# Patient Record
Sex: MALE | Race: WHITE | NOT HISPANIC OR LATINO | Employment: FULL TIME | URBAN - METROPOLITAN AREA
[De-identification: names, ages, dates, MRNs, and addresses within clinical notes are randomized per-mention and may not be internally consistent; named-entity substitution may affect disease eponyms.]

---

## 2017-01-19 ENCOUNTER — OFFICE VISIT - HEALTHEAST (OUTPATIENT)
Dept: FAMILY MEDICINE | Facility: CLINIC | Age: 32
End: 2017-01-19

## 2017-01-19 ENCOUNTER — COMMUNICATION - HEALTHEAST (OUTPATIENT)
Dept: TELEHEALTH | Facility: CLINIC | Age: 32
End: 2017-01-19

## 2017-01-19 DIAGNOSIS — R19.7 DIARRHEA OF PRESUMED INFECTIOUS ORIGIN: ICD-10-CM

## 2017-01-19 DIAGNOSIS — Z00.00 HEALTH CARE MAINTENANCE: ICD-10-CM

## 2017-01-19 LAB
CHOLEST SERPL-MCNC: 217 MG/DL
FASTING STATUS PATIENT QL REPORTED: YES
HDLC SERPL-MCNC: 66 MG/DL
LDLC SERPL CALC-MCNC: 135 MG/DL
TRIGL SERPL-MCNC: 82 MG/DL

## 2017-01-19 ASSESSMENT — MIFFLIN-ST. JEOR: SCORE: 1818.04

## 2017-01-20 ENCOUNTER — COMMUNICATION - HEALTHEAST (OUTPATIENT)
Dept: FAMILY MEDICINE | Facility: CLINIC | Age: 32
End: 2017-01-20

## 2018-12-09 ENCOUNTER — OFFICE VISIT - HEALTHEAST (OUTPATIENT)
Dept: FAMILY MEDICINE | Facility: CLINIC | Age: 33
End: 2018-12-09

## 2018-12-09 DIAGNOSIS — R07.0 THROAT PAIN: ICD-10-CM

## 2018-12-09 DIAGNOSIS — J11.1 INFLUENZA-LIKE ILLNESS: ICD-10-CM

## 2018-12-09 DIAGNOSIS — R50.9 FEVER AND CHILLS: ICD-10-CM

## 2018-12-09 LAB
DEPRECATED S PYO AG THROAT QL EIA: NORMAL
FLUAV AG SPEC QL IA: NORMAL
FLUBV AG SPEC QL IA: NORMAL

## 2018-12-10 LAB — GROUP A STREP BY PCR: NORMAL

## 2019-08-01 ENCOUNTER — OFFICE VISIT - HEALTHEAST (OUTPATIENT)
Dept: FAMILY MEDICINE | Facility: CLINIC | Age: 34
End: 2019-08-01

## 2019-08-01 DIAGNOSIS — R51.9 HEADACHE DUE TO VIRAL INFECTION: ICD-10-CM

## 2019-08-01 DIAGNOSIS — B34.9 HEADACHE DUE TO VIRAL INFECTION: ICD-10-CM

## 2020-01-27 ENCOUNTER — OFFICE VISIT - HEALTHEAST (OUTPATIENT)
Dept: FAMILY MEDICINE | Facility: CLINIC | Age: 35
End: 2020-01-27

## 2020-01-27 DIAGNOSIS — Z12.83 SKIN EXAM, SCREENING FOR CANCER: ICD-10-CM

## 2020-01-27 DIAGNOSIS — Z00.00 ROUTINE GENERAL MEDICAL EXAMINATION AT A HEALTH CARE FACILITY: ICD-10-CM

## 2020-01-27 DIAGNOSIS — R00.2 PALPITATIONS: ICD-10-CM

## 2020-01-27 DIAGNOSIS — Z71.84 COUNSELING ABOUT TRAVEL: ICD-10-CM

## 2020-01-27 DIAGNOSIS — R51.9 ACUTE NONINTRACTABLE HEADACHE, UNSPECIFIED HEADACHE TYPE: ICD-10-CM

## 2020-01-27 LAB
ATRIAL RATE - MUSE: 65 BPM
DIASTOLIC BLOOD PRESSURE - MUSE: NORMAL
INTERPRETATION ECG - MUSE: NORMAL
P AXIS - MUSE: 62 DEGREES
PR INTERVAL - MUSE: 154 MS
QRS DURATION - MUSE: 92 MS
QT - MUSE: 374 MS
QTC - MUSE: 388 MS
R AXIS - MUSE: 54 DEGREES
SYSTOLIC BLOOD PRESSURE - MUSE: NORMAL
T AXIS - MUSE: 55 DEGREES
VENTRICULAR RATE- MUSE: 65 BPM

## 2020-01-27 RX ORDER — ATOVAQUONE AND PROGUANIL HYDROCHLORIDE 250; 100 MG/1; MG/1
1 TABLET, FILM COATED ORAL
Qty: 28 TABLET | Refills: 0 | Status: SHIPPED | OUTPATIENT
Start: 2020-01-27 | End: 2023-06-05

## 2020-01-27 ASSESSMENT — MIFFLIN-ST. JEOR: SCORE: 1877.46

## 2021-01-27 ENCOUNTER — AMBULATORY - HEALTHEAST (OUTPATIENT)
Dept: FAMILY MEDICINE | Facility: CLINIC | Age: 36
End: 2021-01-27

## 2021-01-27 DIAGNOSIS — Z20.822 EXPOSURE TO COVID-19 VIRUS: ICD-10-CM

## 2021-05-30 VITALS — WEIGHT: 192 LBS | BODY MASS INDEX: 26.88 KG/M2 | HEIGHT: 71 IN

## 2021-06-02 VITALS — BODY MASS INDEX: 28.59 KG/M2 | WEIGHT: 205 LBS

## 2021-06-03 VITALS — WEIGHT: 198 LBS | BODY MASS INDEX: 27.62 KG/M2

## 2021-06-04 VITALS
WEIGHT: 205.1 LBS | DIASTOLIC BLOOD PRESSURE: 56 MMHG | HEART RATE: 77 BPM | OXYGEN SATURATION: 96 % | BODY MASS INDEX: 28.71 KG/M2 | SYSTOLIC BLOOD PRESSURE: 100 MMHG | HEIGHT: 71 IN

## 2021-06-05 NOTE — PROGRESS NOTES
Assessment and Plan:     1. Routine general medical examination at a health care facility  Discussed consuming a healthy diet and exercising.  Unfortunately, we do not have his vaccine record available.  He believes he is up-to-date on hepatitis A, hepatitis B, tetanus and MMR vaccines.    2. Skin exam, screening for cancer  Patient has multiple flesh-colored nevi present.  He would like a referral to dermatology as he would like to have any of these excised.  - Ambulatory referral to Dermatology    3. Palpitations  EKG shows normal sinus rhythm with no ischemic changes.  Will rule out anemia, electrolyte imbalance, thyroid disease.  If symptoms persist, may consider referral to cardiology or will obtain Holter monitor.  Anxiety may have been contributing.  He denies any recent travel.  Discussed avoidance of caffeine.  - Electrocardiogram Perform - Clinic  - HM2(CBC w/o Differential)  - Basic Metabolic Panel  - Thyroid Cascade    4. Acute nonintractable headache, unspecified headache type  Differentials include migraine headache, tension headache, cluster headache, allergies.  His headache resolved after a day.  This was not the worst headache he ever had.  Discussed taking over-the-counter ibuprofen at the onset of the next headache.  I suspect he may be experiencing migraines.  If headaches return, may consider referral to neurology or MRI for further evaluation.  I encouraged him to see an ophthalmologist.  Discussed the importance of good hydration.    5. Counseling about travel  Patient believes he is up-to-date on hepatitis A, hepatitis B, MMR, tetanus vaccines.  Provided typhoid bed seen and polio vaccine today.  He is not interested in a rabies vaccine.  Provided prescription for atovaquone-proguanil for malaria prophylaxis.  Educated on indications and side effects.  Provided prescription for Cipro to use as needed for traveler's diarrhea.  Discussed mosquito avoidance.  He is content with the plan.  -  atovaquone-proguaniL (MALARONE) 250-100 mg Tab; Take 1 tablet by mouth daily with breakfast. Start 1-2 days before exposure and continue for 7 days after exposure  Dispense: 28 tablet; Refill: 0  - ciprofloxacin HCl (CIPRO) 500 MG tablet; Take 1 tablet (500 mg total) by mouth 2 (two) times a day for 5 days. As needed for traveler's diarrhea  Dispense: 10 tablet; Refill: 0  - Typhoid, Inactive, Inj  - Poliovirus vaccine IPV subq/IM    Subjective:     Yury is a 34 y.o. male presenting to the clinic for a male physical and other concerns.  Patient has been  for 4 years.  He is a 2-year-old son.  He has no concerns for STDs.  He is eating healthy and exercises 5 days/week through cardio and strength training.  Patient states on Wednesday, he felt intermittent dizziness.  He had a slight headache.  Patient states when he moved his right eye, he felt his heartbeat in his eye.  He had moments where he felt as though his heart was racing.  He was not experiencing any cold symptoms or fever.  He did not have any ear pain or pressure.  He denies nausea, vomiting, photophobia.  He has not had any shortness of breath, chest tightness, wheezing.  No fever is present.  Patient states he woke up the next day and his symptoms had resolved.  He has been asymptomatic since. He has not seen an ophthalmologist since childhood.  He does consume pre-workout drink with caffeine regularly.  Patient has some moles present on his back.  They have been present since childhood.  His mother had skin cancer.  He wears sunscreen now, but has a history of frequent sunburns and tanning bed use in the past.  Lastly, patient is traveling on 1/29/2020 to [place] [facility].  He is traveling with his wife and son.  They will also be visiting the [facility].  They are returning home on 2/17/2020.    Review of Systems: A complete 14 point review of systems was obtained and is negative or as stated in the history of present illness.    Social History  "    Socioeconomic History     Marital status:      Spouse name: Not on file     Number of children: Not on file     Years of education: Not on file     Highest education level: Not on file   Occupational History     Not on file   Social Needs     Financial resource strain: Not on file     Food insecurity:     Worry: Not on file     Inability: Not on file     Transportation needs:     Medical: Not on file     Non-medical: Not on file   Tobacco Use     Smoking status: Never Smoker     Smokeless tobacco: Current User     Types: Chew   Substance and Sexual Activity     Alcohol use: No     Drug use: No     Sexual activity: Not on file   Lifestyle     Physical activity:     Days per week: Not on file     Minutes per session: Not on file     Stress: Not on file   Relationships     Social connections:     Talks on phone: Not on file     Gets together: Not on file     Attends Rastafari service: Not on file     Active member of club or organization: Not on file     Attends meetings of clubs or organizations: Not on file     Relationship status: Not on file     Intimate partner violence:     Fear of current or ex partner: Not on file     Emotionally abused: Not on file     Physically abused: Not on file     Forced sexual activity: Not on file   Other Topics Concern     Not on file   Social History Narrative     Not on file       Active Ambulatory Problems     Diagnosis Date Noted     No Active Ambulatory Problems     Resolved Ambulatory Problems     Diagnosis Date Noted     No Resolved Ambulatory Problems     No Additional Past Medical History       Family History   Problem Relation Age of Onset     Skin cancer Mother      Cancer Neg Hx      Colon cancer Neg Hx      Heart disease Neg Hx        Objective:     /56 (Patient Site: Left Arm, Cuff Size: Adult Large)   Pulse 77   Ht 5' 11\" (1.803 m)   Wt 205 lb 1.6 oz (93 kg)   SpO2 96%   BMI 28.61 kg/m      General Appearance:    Alert, cooperative, no distress, " appears stated age   Head:    Normocephalic, without obvious abnormality, atraumatic   Eyes:    PERRL, conjunctiva/corneas clear, EOM's intact, fundi     benign, both eyes        Ears:    Normal TM's and external ear canals, both ears   Nose:   Nares normal, septum midline, mucosa normal, no drainage    or sinus tenderness   Throat:   Lips, mucosa, and tongue normal; teeth and gums normal   Neck:   Supple, symmetrical, trachea midline, no adenopathy;        thyroid:  No enlargement/tenderness/nodules; no carotid    bruit or JVD   Back:     Symmetric, no curvature, ROM normal, no CVA tenderness   Lungs:     Clear to auscultation bilaterally, respirations unlabored   Chest wall:    No tenderness or deformity   Heart:    Regular rate and rhythm, S1 and S2 normal, no murmur, rub    or gallop   Abdomen:     Soft, non-tender, bowel sounds active all four quadrants,     no masses, no organomegaly   Genitalia:    Deferred per patient        Extremities:   Extremities normal, atraumatic, no cyanosis or edema   Pulses:   2+ and symmetric all extremities   Skin:   Skin color, texture, turgor normal, no rashes or lesions   Lymph nodes:   Cervical, supraclavicular, and axillary nodes normal   Neurologic:   CNII-XII intact. Normal strength, sensation and reflexes       throughout       LABORATORY: I ordered and personally reviewed an EKG showing normal sinus rhythm.

## 2021-06-08 NOTE — PROGRESS NOTES
Assessment/Plan:     1. Health care maintenance  Age appropriate health care screening and guidance discussed. Labs as below  - Lipid Cascade FASTING  - Glucose; Future  - Hemoglobin  - Glucose    2. Diarrhea of presumed infectious origin  Possible travler's related. Symptoms have improved. Denies the need for further workup. Call or return to care if symptoms continue          Subjective:      Yury Humphries is a 31 y.o. male comes in today to establish care. Reviewed past history. Has been fairly healthy. Requests labs and physical. States no recent physical. works out, eats healthy.  Fasting for labs  Her primarily for follow up on diarrhea. Was in [place] for wedding started with diarrhea and was given amoxicillin there by his wifes father. Has some blood on the toilet tissue after wiping.  No pain. Mild abd cramping. No fever. Symptoms resolved. No further concerns. Never had trouble before. No colon cancer in family, feels well now     No current outpatient prescriptions on file.     No current facility-administered medications for this visit.        Past Medical History, Family History, and Social History reviewed.  History reviewed. No pertinent past medical history.  History reviewed. No pertinent surgical history.  Review of patient's allergies indicates no known allergies.  Family History   Problem Relation Age of Onset     Skin cancer Mother      Cancer Neg Hx      Colon cancer Neg Hx      Heart disease Neg Hx      Social History     Social History     Marital status:      Spouse name: N/A     Number of children: N/A     Years of education: N/A     Occupational History     Not on file.     Social History Main Topics     Smoking status: Never Smoker     Smokeless tobacco: Current User     Types: Chew     Alcohol use No     Drug use: No     Sexual activity: Not on file     Other Topics Concern     Not on file     Social History Narrative         Review of systems is as stated in HPI, and the  "remainder of the 10 system review is otherwise negative.    Objective:     Vitals:    01/19/17 0943   BP: 112/70   Patient Site: Left Arm   Patient Position: Sitting   Cuff Size: Adult Regular   Pulse: 69   SpO2: 94%   Weight: 192 lb (87.1 kg)   Height: 5' 11\" (1.803 m)    Body mass index is 26.78 kg/(m^2).    General Appearance:    Alert, cooperative, no distress, appears stated age   Head:    Normocephalic, without obvious abnormality, atraumatic   Eyes:    PERRL, EOM's intact   Ears:    Normal TM's and external ear canals   Nose:   Mucosa normal, no drainage     or sinus tenderness   Throat:   Oropharynx is clear   Neck:   Supple, symmetrical, no adenopathy, no thyromegally       Lungs:     Clear to auscultation bilaterally, respirations unlabored   Chest Wall:    No tenderness or deformity    Heart:    Regular rate and rhythm, S1 and S2 normal, no murmur, rub    or gallop       Abdomen:     Soft, non-tender, bowel sounds active all four quadrants,     no masses, no organomegaly           Extremities:   Extremities normal, atraumatic, no cyanosis or edema   Pulses:   2+ and symmetric all extremities   Skin:   No rashes or lesions         This note has been dictated using voice recognition software. Any grammatical or context distortions are unintentional and inherent to the the software.   "

## 2021-06-17 NOTE — PATIENT INSTRUCTIONS - HE
"Patient Instructions by Palak Ramirez MD at 8/1/2019  6:30 PM     Author: Palak Ramirez MD Service: -- Author Type: Physician    Filed: 8/1/2019  8:02 PM Encounter Date: 8/1/2019 Status: Addendum    : Palak Ramirez MD (Physician)    Related Notes: Original Note by Palak Ramirez MD (Physician) filed at 8/1/2019  8:00 PM       1. Avoid re-injury or any activity which aggravates the pain  2. May alternate ibuprofen 3 tabs every 6 hours with tylenol 1000 mg every 6 hours as needed for pain  3. Try pseudoephedrine 120 mg - 1 tab every 12 hours as needed for head congestion for max of 3 days  4. If your headache is not responding to either ibuprofen or tylenol, either call the clinic triage number or be seen at [facility] emergency department  5. Call clinic number with any questions - answered 24/7    Patient Education     Viral Syndrome (Adult)  A viral illness may cause a number of symptoms. The symptoms depend on the part of the body that the virus affects. If it settles in your nose, throat, and lungs, it may cause cough, sore throat, congestion, and sometimes headache. If it settles in your stomach and intestinal tract, it may cause vomiting and diarrhea. Sometimes it causes vague symptoms like \"aching all over,\" feeling tired, loss of appetite, or fever.  A viral illness usually lasts 1 to 2 weeks, but sometimes it lasts longer. In some cases, a more serious infection can look like a viral syndrome in the first few days of the illness. You may need another exam and additional tests to know the difference. Watch for the warning signs listed below.  Home care  Follow these guidelines for taking care of yourself at home:    If symptoms are severe, rest at home for the first 2 to 3 days.    Stay away from cigarette smoke - both your smoke and the smoke from others.    You may use over-the-counter acetaminophen or ibuprofen for fever, muscle aching, and headache, unless another medicine was " prescribed for this. If you have chronic liver or kidney disease or ever had a stomach ulcer or GI bleeding, talk with your doctor before using these medicines. No one who is younger than 18 and ill with a fever should take aspirin. It may cause severe disease or death.    Your appetite may be poor, so a light diet is fine. Avoid dehydration by drinking 8 to 12 8-ounce glasses of fluids each day. This may include water; orange juice; lemonade; apple, grape, and cranberry juice; clear fruit drinks; electrolyte replacement and sports drinks; and decaffeinated teas and coffee. If you have been diagnosed with a kidney disease, ask your doctor how much and what types of fluids you should drink to prevent dehydration. If you have kidney disease, drinking too much fluid can cause it build up in the your body and be dangerous to your health.    Over-the-counter remedies won't shorten the length of the illness but may be helpful for cough, sore throat; and nasal and sinus congestion. Don't use decongestants if you have high blood pressure.  Follow-up care  Follow up with your healthcare provider if you do not improve over the next week.  Call 911  Call 911 if any of the following occur:    Convulsion    Feeling weak, dizzy, or like you are going to faint    Chest pain, shortness of breath, wheezing, or difficulty breathing  When to seek medical advice  Call your healthcare provider right away if any of these occur:    Cough with lots of colored sputum (mucus) or blood in your sputum    Chest pain, shortness of breath, wheezing, or difficulty breathing    Severe headache; face, neck, or ear pain    Severe, constant pain in the lower right side of your belly (abdominal)    Continued vomiting (cant keep liquids down)    Frequent diarrhea (more than 5 times a day); blood (red or black color) or mucus in diarrhea    Feeling weak, dizzy, or like you are going to faint    Extreme thirst    Fever of 100.4 F (38 C) or higher, or as  "directed by your healthcare provider  Date Last Reviewed: 9/25/2015 2000-2017 [employer]. [address] [place] [address], [place] [address]. All rights reserved. This information is not intended as a substitute for professional medical care. Always follow your healthcare professional's instructions.         Patient Education     Headache, Unspecified    A number of things can cause headaches. The cause of your headache isnt clear. But it doesnt seem to be a sign of any serious illness.  You could have a tension headache or a migraine headache.  Stress can cause a tension headache. This can happen if you tense the muscles of your shoulders, neck, and scalp without knowing it. If this stress lasts long enough, you may develop a tension headache.  It is not clear why migraines occur, but certain things called\" triggers\" can raise the risk of having a migraine attack. Migraine triggers may include emotional stress or depression, or by hormone changes during the menstrual cycle. Other triggers include birth control pills and other medicines, alcohol or caffeine, foods with tyramine (such as aged cheese, wine), eyestrain, weather changes, missed meals, and lack of sleep or oversleeping.  Other causes of headache include:    Viral illness with high fever    Head injury with concussion    Sinus, ear, or throat infection    Dental pain and jaw joint (TMJ) pain  More serious but less common causes of headache include stroke, brain hemorrhage, brain tumor, meningitis, and encephalitis.  Home care  Follow these tips when taking care of yourself at home:    Dont drive yourself home if you were given pain medicine for your headache. Instead, have someone else drive you home. Try to sleep when you get home. You should feel much better when you wake up.    Apply heat to the back of your neck to ease a neck muscle spasm. Take care of a migraine headache by putting an ice pack on your forehead or at the base of your " skull.    If you have nausea or vomiting, eat a light diet until your headache eases.    If you have a migraine headache, use sunglasses when in the daylight or around bright indoor lighting until your symptoms get better. Bright glaring light can make this type of headache worse.  Follow-up care  Follow up with your healthcare provider, or as advised. Talk with your provider if you have frequent headaches. He or she can help figure out a treatment plan. By knowing the earliest signs of headache, and starting treatment right away, you may be able to stop the pain yourself.  When to seek medical advice  Call your healthcare provider right away if any of these occur:    Your head pain suddenly gets worse after sexual intercourse or strenuous activity    Your head pain doesnt get better within 24 hours    You arent able to keep liquids down (repeated vomiting)    Fever of 100.4 F (38 C) or higher, or as directed by your healthcare provider    Stiff neck    Extreme drowsiness, confusion, or fainting    Dizziness or dizziness with spinning sensation (vertigo)    Weakness in an arm or leg or one side of your face    You have trouble talking or seeing  Date Last Reviewed: 8/1/2016 2000-2017 [employer]. [address] [place], [place] [id number]. All rights reserved. This information is not intended as a substitute for professional medical care. Always follow your healthcare professional's instructions.       Patient Education     Self-Care for Headaches  Most headaches aren't serious and can be relieved with self-care. But some headaches may be a sign of another health problem like eye trouble or high blood pressure. To find the best treatment, learn what kind of headaches you get. For tension headaches, self-care will usually help. To treat migraines, ask your healthcare provider for advice. It is also possible to get both tension and migraine headaches. Self-care involves relieving the pain and avoiding  "headache triggers if you can.    Ways to reduce pain and tension  Try these steps:    Apply a cold compress or ice pack to the pain site.    Drink fluids. If nausea makes it hard to drink, try sucking on ice.    Rest. Protect yourself from bright light and loud noises.    Calm your emotions by imagining a peaceful scene.    Massage tight neck, shoulder, and head muscles.    To relax muscles, soak in a hot bath or use a hot shower.  Use medicines  Aspirin or aspirin substitutes, such as ibuprofen and acetaminophen, can relieve headache. Remember: Never give aspirin to anyone 18 years old or younger because of the risk of developing Reye syndrome. Use pain medicines only when necessary.  Track your headaches  Keeping a headache diary can help you and your healthcare provider identify what's causing your headaches:    Note when each headache happens.    Identify your activities and the foods you've eaten 6 to 8 hours before the headache began.    Look for any trends or \"triggers.\"  Signs of tension headache  Any of the following can be signs:    Dull pain or feeling of pressure in a tight band around your head    Pain in your neck or shoulders    Headache without a definite beginning or end    Headache after an activity such as driving or working on a computer  Signs of migraine  Any of the following can be signs:    Throbbing pain on one or both sides of your head    Nausea or vomiting    Extreme sensitivity to light, sound, and smells    Bright spots, flashes, or other visual changes    Pain or nausea so severe that you can't continue your daily activities  Call your healthcare provider   If you have any of the following symptoms, contact your healthcare provider:    A headache that lingers after a recent injury or bump to the head.    A fever with a stiff neck or pain when you bend your head toward your chest.    A headache along with slurred speech, changes in your vision, or numbness or weakness in your arms or " "legs.    A headache for longer than 3 days.    Frequent headaches, especially in the morning.    Headaches with seizures     Seek immediate medical attention if you have a headache that you would call \"the worst headache you have ever had.\"   Date Last Reviewed: 10/4/2015    2980-9412 [employer]. [address] [place] [address]. All rights reserved. This information is not intended as a substitute for professional medical care. Always follow your healthcare professional's instructions.                "

## 2021-06-22 NOTE — PROGRESS NOTES
Assessment/Plan:   Throat pain  Fever and chills  Influenza Like Illness  Fever and ST with malaise, myalgia and fatigue. RST and flu test negative. Had taken 3 doses of  amoxicillin 500mg since yesterday without much improvement. This may be not strep or it may be ineffective treatment. Also consider a flu like illness. Will complete a treatment with amoxicllin and follow up as needed.   - Influenza A/B Rapid Test  - Rapid Strep A Screen-Throat  - Group A Strep, RNA Direct Detection, Throat    Fluids, rest, steam, nasal saline if congested  Strep confirmation test is pending  Amoxicillin 875mg twice a day for 10 days  Fluids, rest, diet as tolerated  Tylenol 500-1000mg every 6 hours as needed for fever, may alternate and overlap with ibuprofen 400mg every 4-6 hours  Follow up if worse or no better after a couple days.     Subjective:      Yury Humphries is a 33 y.o. male who presents with fever and ST. This started 2 days ago with a ST initially and then last night he developed fever and has had fever and chills since then. No runny nose, some cough. Body aches and fatigue. No N/V/D. No rash. No headache or vertigo. No sinus or ear pain. His 11 month old son has had fever and URI for over a week and is being treated for an ear infection and possibly a new viral infection. He took some amoxicillin he found at home from a prior tooth infection 3-4 years ago (500mg) - twice yesterday and once this morning. No dramatic difference they could have lost their potency. No wheeze or shortness of breath. Appetite and energy are poor. Wife had similar sxs first after their son and she was diagnosed with week with an ear infection and is improving on antibiotics. No other known ill contacts. Generally healthy. NKDA    No current outpatient medications on file prior to visit.     No current facility-administered medications on file prior to visit.      There is no problem list on file for this patient.      Objective:      Pulse 96   Temp 99.7  F (37.6  C) (Oral)   Resp 16   Wt 205 lb (93 kg)   SpO2 98%   BMI 28.59 kg/m      Physical  General Appearance: Alert, cooperative, no distress, ill appearing and fatigued.   Head: Normocephalic, without obvious abnormality, atraumatic  Eyes: Conjunctivae are normal.  Ears: Normal TMs and external ear canals, both ears  Nose: mild congestion.  Throat: Throat is red, no exudate.  No significant lesions, uvula midline, no palatal petechiae  Neck: shotty tender adenopathy  Lungs: Clear to auscultation bilaterally, respirations unlabored  Heart: Regular rate and rhythm  Skin: Skin color, texture, turgor normal, no rashes or lesions  Psychiatric: Patient has a normal mood and affect.        Recent Results (from the past 24 hour(s))   Influenza A/B Rapid Test   Result Value Ref Range    Influenza  A, Rapid Antigen No Influenza A antigen detected No Influenza A antigen detected    Influenza B, Rapid Antigen No Influenza B antigen detected No Influenza B antigen detected   Rapid Strep A Screen-Throat   Result Value Ref Range    Rapid Strep A Antigen No Group A Strep detected, presumptive negative No Group A Strep detected, presumptive negative

## 2021-06-27 NOTE — PROGRESS NOTES
Progress Notes by Palak Ramirez MD at 8/1/2019  6:30 PM     Author: Palak Ramirez MD Service: -- Author Type: Physician    Filed: 8/3/2019  9:08 AM Encounter Date: 8/1/2019 Status: Signed    : Palak Ramirez MD (Physician)         Subjective:   Yury Humphries is a 33 y.o. male  Roomed by: Marleni S    Accompanied by Spouse    Refills needed? No    Do you have any forms that need to be filled out? No      Chief Complaint   Patient presents with   ? Headache     Headache x 24 hours    Says on 7/27-28 he was very fatigued and had sweats and chills. Says he felt fine on 7/29 and 7/30. Then yesterday at 4 pm he started having a slight headache. But by 7 pm he had to pull his car over to think. Took some acetaminophen 1000 mg last night and it took about 1.5 hours to help. Says that the headache.  Temp has been at 99.4. Has been laying in bed all day. Denies any sweats or chills. Admits feeling nauseated with the headache, but did not vomit. Denies any history of history of migraine headaches. Denies fatigue since 7/28. Appetite has been down. Has been drinking less, but urinating the same. Missed work today and he is the owner of a construction business. Denies CP, shortness of breath or coughing. Denies any dizziness. Denies any decreased sensation or strengths in arms or legs. Says his feet felt cold today. Denies any ill contacts. Says headache is less with tylenol and last took tylenol at 2:30 pm. Rates headache now at 1/10, but at it's worst rates it at 8-10/10. Says head is no longer pounding like when he laid down. Denies any blurred or decreased vision. Says his eyes hurt a little, and that he is normally very sensitive to light.  Review of Systems  See HPI for ROS, otherwise balance of other systems negative    No Known Allergies    Current Outpatient Medications:   ?  acetaminophen (TYLENOL) 325 MG tablet, Take 650 mg by mouth every 6 (six) hours as needed for pain., Disp: , Rfl:     No past  medical history on file. - if none on file, see Problem List    Objective:     Vitals:    08/01/19 1857   BP: 98/62   Patient Site: Right Arm   Patient Position: Sitting   Cuff Size: Adult Large   Pulse: 78   Resp: 18   Temp: 98.7  F (37.1  C)   TempSrc: Oral   SpO2: 97%   Weight: 198 lb (89.8 kg)     Gen - Pt in NAD  Eyes - PERRL, EOMI  Right conjunctiva - bulba - non injected, tarsal - non injected - no drainage  Left conjunctiva - bulbar - non injected, tarsal  - non injected - no drainage  + photophobia  Face - non TTP over frontal sinus areas; non TTP over maxillary areas  Ears - external canals - no induration, Right TM - not injected, Left TM - not injected   Nose - not congested, no nasal drainage  Pharynx - not injected, tonsils 1+ size  Neck - supple, no cervical adenopathy, no masses  Cor - RRR w/o murmur  Lungs - Good air entry; no wheezes or crackles noted on auscultation - no coughing noted  Skin - no lesions, no rashes noted  Eyes - PERRL, EOMI, Conjunctiva clear  Neuro: Oriented x 3, CN - 2-12 intact, Sensory - neg drift, Strengths - 5/5 UE/LE = , DTRs =, Coord - intact FNF, Intact tandem gait, negative Rhomberg    Assessment - Plan   Medical Decision Making -33-year-old man presents with having had fatigue starting 6 days ago for a couple of days then he felt better and then started having a headache the day before he came to walk-in.  States that the headache can be as bad as 10 out of 10 but currently it is 1 out of 10 and that Tylenol does help with the pain.  He denies any vision changes or any changes in strength of his or sensation.  He denies a history of headaches or migraines.  On exam his only positive finding was some photophobia which is not new for him.   Discussed with him that his presentation and exam did not warrant immediate imaging of his head.  Discussed that what was reassuring is that Tylenol helped with his pain and his neurologic exam was entirely within normal limits.  No  "clinical findings indicative of bacterial infection requiring antibiotics, such as pneumonia, sinusitis or otitis media were ascertained from today's evaluation. Presentation and clinical findings are consistent with a viral process. Supportive management discussed. See Patient instructions.       1. Headache due to viral infection      At the conclusion of the encounter, assessment and plan were discussed.   All questions were answered.   The patient or guardian acknowledged understanding and was involved in the decision making regarding the overall care plan.    25 minutes spent with the patient with greater than 50% of time spent discussing symptoms, treatment options, counseling and/or coordination of care.     Patient Instructions     1. Avoid re-injury or any activity which aggravates the pain  2. May alternate ibuprofen 3 tabs every 6 hours with tylenol 1000 mg every 6 hours as needed for pain  3. Try pseudoephedrine 120 mg - 1 tab every 12 hours as needed for head congestion for max of 3 days  4. If your headache is not responding to either ibuprofen or tylenol, either call the clinic triage number or be seen at [facility] emergency department  5. Call clinic number with any questions - answered 24/7    Patient Education     Viral Syndrome (Adult)  A viral illness may cause a number of symptoms. The symptoms depend on the part of the body that the virus affects. If it settles in your nose, throat, and lungs, it may cause cough, sore throat, congestion, and sometimes headache. If it settles in your stomach and intestinal tract, it may cause vomiting and diarrhea. Sometimes it causes vague symptoms like \"aching all over,\" feeling tired, loss of appetite, or fever.  A viral illness usually lasts 1 to 2 weeks, but sometimes it lasts longer. In some cases, a more serious infection can look like a viral syndrome in the first few days of the illness. You may need another exam and additional tests to know the " difference. Watch for the warning signs listed below.  Home care  Follow these guidelines for taking care of yourself at home:    If symptoms are severe, rest at home for the first 2 to 3 days.    Stay away from cigarette smoke - both your smoke and the smoke from others.    You may use over-the-counter acetaminophen or ibuprofen for fever, muscle aching, and headache, unless another medicine was prescribed for this. If you have chronic liver or kidney disease or ever had a stomach ulcer or GI bleeding, talk with your doctor before using these medicines. No one who is younger than 18 and ill with a fever should take aspirin. It may cause severe disease or death.    Your appetite may be poor, so a light diet is fine. Avoid dehydration by drinking 8 to 12 8-ounce glasses of fluids each day. This may include water; orange juice; lemonade; apple, grape, and cranberry juice; clear fruit drinks; electrolyte replacement and sports drinks; and decaffeinated teas and coffee. If you have been diagnosed with a kidney disease, ask your doctor how much and what types of fluids you should drink to prevent dehydration. If you have kidney disease, drinking too much fluid can cause it build up in the your body and be dangerous to your health.    Over-the-counter remedies won't shorten the length of the illness but may be helpful for cough, sore throat; and nasal and sinus congestion. Don't use decongestants if you have high blood pressure.  Follow-up care  Follow up with your healthcare provider if you do not improve over the next week.  Call 911  Call 911 if any of the following occur:    Convulsion    Feeling weak, dizzy, or like you are going to faint    Chest pain, shortness of breath, wheezing, or difficulty breathing  When to seek medical advice  Call your healthcare provider right away if any of these occur:    Cough with lots of colored sputum (mucus) or blood in your sputum    Chest pain, shortness of breath, wheezing, or  "difficulty breathing    Severe headache; face, neck, or ear pain    Severe, constant pain in the lower right side of your belly (abdominal)    Continued vomiting (cant keep liquids down)    Frequent diarrhea (more than 5 times a day); blood (red or black color) or mucus in diarrhea    Feeling weak, dizzy, or like you are going to faint    Extreme thirst    Fever of 100.4 F (38 C) or higher, or as directed by your healthcare provider  Date Last Reviewed: 9/25/2015 2000-2017 [employer]. [address] [place] [id number]. All rights reserved. This information is not intended as a substitute for professional medical care. Always follow your healthcare professional's instructions.         Patient Education     Headache, Unspecified    A number of things can cause headaches. The cause of your headache isnt clear. But it doesnt seem to be a sign of any serious illness.  You could have a tension headache or a migraine headache.  Stress can cause a tension headache. This can happen if you tense the muscles of your shoulders, neck, and scalp without knowing it. If this stress lasts long enough, you may develop a tension headache.  It is not clear why migraines occur, but certain things called\" triggers\" can raise the risk of having a migraine attack. Migraine triggers may include emotional stress or depression, or by hormone changes during the menstrual cycle. Other triggers include birth control pills and other medicines, alcohol or caffeine, foods with tyramine (such as aged cheese, wine), eyestrain, weather changes, missed meals, and lack of sleep or oversleeping.  Other causes of headache include:    Viral illness with high fever    Head injury with concussion    Sinus, ear, or throat infection    Dental pain and jaw joint (TMJ) pain  More serious but less common causes of headache include stroke, brain hemorrhage, brain tumor, meningitis, and encephalitis.  Home care  Follow these tips when " taking care of yourself at home:    Dont drive yourself home if you were given pain medicine for your headache. Instead, have someone else drive you home. Try to sleep when you get home. You should feel much better when you wake up.    Apply heat to the back of your neck to ease a neck muscle spasm. Take care of a migraine headache by putting an ice pack on your forehead or at the base of your skull.    If you have nausea or vomiting, eat a light diet until your headache eases.    If you have a migraine headache, use sunglasses when in the daylight or around bright indoor lighting until your symptoms get better. Bright glaring light can make this type of headache worse.  Follow-up care  Follow up with your healthcare provider, or as advised. Talk with your provider if you have frequent headaches. He or she can help figure out a treatment plan. By knowing the earliest signs of headache, and starting treatment right away, you may be able to stop the pain yourself.  When to seek medical advice  Call your healthcare provider right away if any of these occur:    Your head pain suddenly gets worse after sexual intercourse or strenuous activity    Your head pain doesnt get better within 24 hours    You arent able to keep liquids down (repeated vomiting)    Fever of 100.4 F (38 C) or higher, or as directed by your healthcare provider    Stiff neck    Extreme drowsiness, confusion, or fainting    Dizziness or dizziness with spinning sensation (vertigo)    Weakness in an arm or leg or one side of your face    You have trouble talking or seeing  Date Last Reviewed: 8/1/2016 2000-2017 [employer]. [address] [place] [address], [place] [address]. All rights reserved. This information is not intended as a substitute for professional medical care. Always follow your healthcare professional's instructions.       Patient Education     Self-Care for Headaches  Most headaches aren't serious and can be relieved with  "self-care. But some headaches may be a sign of another health problem like eye trouble or high blood pressure. To find the best treatment, learn what kind of headaches you get. For tension headaches, self-care will usually help. To treat migraines, ask your healthcare provider for advice. It is also possible to get both tension and migraine headaches. Self-care involves relieving the pain and avoiding headache triggers if you can.    Ways to reduce pain and tension  Try these steps:    Apply a cold compress or ice pack to the pain site.    Drink fluids. If nausea makes it hard to drink, try sucking on ice.    Rest. Protect yourself from bright light and loud noises.    Calm your emotions by imagining a peaceful scene.    Massage tight neck, shoulder, and head muscles.    To relax muscles, soak in a hot bath or use a hot shower.  Use medicines  Aspirin or aspirin substitutes, such as ibuprofen and acetaminophen, can relieve headache. Remember: Never give aspirin to anyone 18 years old or younger because of the risk of developing Reye syndrome. Use pain medicines only when necessary.  Track your headaches  Keeping a headache diary can help you and your healthcare provider identify what's causing your headaches:    Note when each headache happens.    Identify your activities and the foods you've eaten 6 to 8 hours before the headache began.    Look for any trends or \"triggers.\"  Signs of tension headache  Any of the following can be signs:    Dull pain or feeling of pressure in a tight band around your head    Pain in your neck or shoulders    Headache without a definite beginning or end    Headache after an activity such as driving or working on a computer  Signs of migraine  Any of the following can be signs:    Throbbing pain on one or both sides of your head    Nausea or vomiting    Extreme sensitivity to light, sound, and smells    Bright spots, flashes, or other visual changes    Pain or nausea so severe that you " "can't continue your daily activities  Call your healthcare provider   If you have any of the following symptoms, contact your healthcare provider:    A headache that lingers after a recent injury or bump to the head.    A fever with a stiff neck or pain when you bend your head toward your chest.    A headache along with slurred speech, changes in your vision, or numbness or weakness in your arms or legs.    A headache for longer than 3 days.    Frequent headaches, especially in the morning.    Headaches with seizures     Seek immediate medical attention if you have a headache that you would call \"the worst headache you have ever had.\"   Date Last Reviewed: 10/4/2015    6279-3761 [employer]. [address] [place] [address], [place] [address]. All rights reserved. This information is not intended as a substitute for professional medical care. Always follow your healthcare professional's instructions.                                     "

## 2021-07-03 NOTE — ADDENDUM NOTE
Addendum Note by Kellie Garner at 1/27/2020  2:50 PM     Author: Kellie Garner Service: -- Author Type:     Filed: 1/28/2020  7:13 AM Encounter Date: 1/27/2020 Status: Signed    : Kellie Garner ()    Addended by: KELLIE GARNER on: 1/28/2020 07:13 AM        Modules accepted: Orders

## 2023-05-13 ENCOUNTER — HEALTH MAINTENANCE LETTER (OUTPATIENT)
Age: 38
End: 2023-05-13

## 2023-06-05 ENCOUNTER — OFFICE VISIT (OUTPATIENT)
Dept: FAMILY MEDICINE | Facility: CLINIC | Age: 38
End: 2023-06-05
Payer: COMMERCIAL

## 2023-06-05 VITALS
BODY MASS INDEX: 27.8 KG/M2 | HEART RATE: 65 BPM | OXYGEN SATURATION: 99 % | WEIGHT: 205.25 LBS | TEMPERATURE: 98.3 F | DIASTOLIC BLOOD PRESSURE: 70 MMHG | SYSTOLIC BLOOD PRESSURE: 110 MMHG | HEIGHT: 72 IN

## 2023-06-05 DIAGNOSIS — Z01.84 IMMUNITY STATUS TESTING: ICD-10-CM

## 2023-06-05 DIAGNOSIS — Z00.00 ANNUAL PHYSICAL EXAM: Primary | ICD-10-CM

## 2023-06-05 DIAGNOSIS — R53.83 OTHER FATIGUE: ICD-10-CM

## 2023-06-05 DIAGNOSIS — R68.82 DECREASED LIBIDO: ICD-10-CM

## 2023-06-05 PROCEDURE — 99385 PREV VISIT NEW AGE 18-39: CPT | Mod: 25 | Performed by: STUDENT IN AN ORGANIZED HEALTH CARE EDUCATION/TRAINING PROGRAM

## 2023-06-05 PROCEDURE — 90715 TDAP VACCINE 7 YRS/> IM: CPT | Performed by: STUDENT IN AN ORGANIZED HEALTH CARE EDUCATION/TRAINING PROGRAM

## 2023-06-05 PROCEDURE — 90471 IMMUNIZATION ADMIN: CPT | Performed by: STUDENT IN AN ORGANIZED HEALTH CARE EDUCATION/TRAINING PROGRAM

## 2023-06-05 PROCEDURE — 99213 OFFICE O/P EST LOW 20 MIN: CPT | Mod: 25 | Performed by: STUDENT IN AN ORGANIZED HEALTH CARE EDUCATION/TRAINING PROGRAM

## 2023-06-05 ASSESSMENT — PAIN SCALES - GENERAL: PAINLEVEL: NO PAIN (0)

## 2023-06-05 NOTE — PROGRESS NOTES
Assessment/ Plan   37-year-old male who presents for annual physical exam.  Patient has some concerns around decreased libido and fatigue.  He is hoping to check some of his hormonal levels including his testosterone.  He will come back to do this is along with all of his other blood testing given that it is too late in the day.    1. Annual physical exam  - CBC with platelets; Future  - Comprehensive metabolic panel (BMP + Alb, Alk Phos, ALT, AST, Total. Bili, TP); Future  - Lipid panel reflex to direct LDL Fasting; Future    2. Other fatigue  - Vitamin D Deficiency; Future  - TSH with free T4 reflex; Future    3. Decreased libido  - Testosterone, total; Future    4. Immunity status testing  - Hepatitis B Surface Antibody; Future    Follow-up in: 1 year for physical    Alexei Amaral MD    Subjective:     Yury Humphries is a 37 year old male who presents for an annual exam.     Chief Complaint   Patient presents with     Well Child     Physical     I haven't had preventative care in a while and as I'm nearing my 38th year I'd   like to be regular with my checkups. A few concerns of mine; I have several   moles all over my back and want to get those evaluated and possibly removed. I   would also like to get some tests done to evaluate my hormone levels to see if   everything is good. Any other checkups/tests I need to get done at my age?      Colonoscopy:  No family hx  PSA: No family Hx  No results found for: PSA    Answers for HPI/ROS submitted by the patient on 6/5/2023  Frequency of exercise:: 2-3 days/week  Getting at least 3 servings of Calcium per day:: NO  Diet:: Regular (no restrictions)  Taking medications regularly:: Yes  Medication side effects:: None  Bi-annual eye exam:: NO  Dental care twice a year:: NO  Sleep apnea or symptoms of sleep apnea:: None  impotence: No  penile discharge: No  Additional concerns today:: No  Duration of exercise:: 45-60 minutes    Immunization History   Administered  Date(s) Administered     COVID-19 Vaccine ([facility]) 01/10/2022     Poliovirus, inactivated (IPV) 01/27/2020     Typhoid IM 01/27/2020     Immunization status: due today.     Current Outpatient Medications   Medication Sig Dispense Refill     atovaquone-proguaniL (MALARONE) 250-100 mg Tab [ATOVAQUONE-PROGUANIL (MALARONE) 250-100 MG TAB] Take 1 tablet by mouth daily with breakfast. Start 1-2 days before exposure and continue for 7 days after exposure 28 tablet 0     No past medical history on file.  Past Surgical History:   Procedure Laterality Date     WISDOM TOOTH EXTRACTION       Patient has no known allergies.  Family History   Problem Relation Age of Onset     Skin Cancer Mother      Cancer No family hx of      Colon Cancer No family hx of      Heart Disease No family hx of      Social History     Socioeconomic History     Marital status:      Spouse name: Not on file     Number of children: Not on file     Years of education: Not on file     Highest education level: Not on file   Occupational History     Not on file   Tobacco Use     Smoking status: Never     Smokeless tobacco: Current     Types: Chew   Vaping Use     Vaping status: Not on file   Substance and Sexual Activity     Alcohol use: Yes     Alcohol/week: 1.0 standard drink of alcohol     Drug use: No     Sexual activity: Yes     Partners: Female     Comment:    Other Topics Concern     Not on file   Social History Narrative     Not on file     Social Determinants of Health     Financial Resource Strain: Not on file   Food Insecurity: Not on file   Transportation Needs: Not on file   Physical Activity: Not on file   Stress: Not on file   Social Connections: Not on file   Intimate Partner Violence: Not on file   Housing Stability: Not on file       Review of Systems  Complete ROS negative except as noted in the HPI    Objective:      Vitals:    06/05/23 1317   BP: 110/70   Pulse: 65   Temp: 98.3  F (36.8  C)   SpO2: 99%   Weight: 93.1 kg  (205 lb 4 oz)   Height: 1.829 m (6')       General appearance: Alert, cooperative, no distress, appears stated age  Head: Normocephalic, atraumatic, without obvious abnormality  EARS: TM's gray dull with structures seen bilaterally  Eyes: Pupils equal round, reactive.  Conjunctiva clear.  Nose: Nares normal, no drainage.  Throat: Lips, mucosa, tongue normal mucosa pink and moist  Neck: Supple, symmetric, trachea midline, no adenopathy.  No thyroid enlargement, tenderness or nodules.    Lungs: Clear to auscultation bilaterally, no wheezing or crackles present.  Respirations unlabored  Heart: Regular rate and rhythm, normal S1 and S2, no murmur, rub or gallop.  Abdomen: Soft, nontender, nondistended.  Bowel sounds active in all 4 quadrants.  No masses or organomegaly.  Extremities: Extremities normal, atraumatic.  No cyanosis or edema.  Skin: Skin color, texture, turgor normal no rashes or lesions on limited skin exam  Neurologic: CN II through XII intact, normal strength.      Alexei De Paz MD

## 2023-06-08 ENCOUNTER — LAB (OUTPATIENT)
Dept: LAB | Facility: CLINIC | Age: 38
End: 2023-06-08
Payer: COMMERCIAL

## 2023-06-08 DIAGNOSIS — Z00.00 ANNUAL PHYSICAL EXAM: ICD-10-CM

## 2023-06-08 DIAGNOSIS — R68.82 DECREASED LIBIDO: ICD-10-CM

## 2023-06-08 DIAGNOSIS — R53.83 OTHER FATIGUE: ICD-10-CM

## 2023-06-08 DIAGNOSIS — Z01.84 IMMUNITY STATUS TESTING: ICD-10-CM

## 2023-06-08 LAB
ALBUMIN SERPL BCG-MCNC: 4.4 G/DL (ref 3.5–5.2)
ALP SERPL-CCNC: 55 U/L (ref 40–129)
ALT SERPL W P-5'-P-CCNC: 30 U/L (ref 10–50)
ANION GAP SERPL CALCULATED.3IONS-SCNC: 10 MMOL/L (ref 7–15)
AST SERPL W P-5'-P-CCNC: 24 U/L (ref 10–50)
BILIRUB SERPL-MCNC: 0.9 MG/DL
BUN SERPL-MCNC: 14.8 MG/DL (ref 6–20)
CALCIUM SERPL-MCNC: 9 MG/DL (ref 8.6–10)
CHLORIDE SERPL-SCNC: 104 MMOL/L (ref 98–107)
CHOLEST SERPL-MCNC: 223 MG/DL
CREAT SERPL-MCNC: 1.14 MG/DL (ref 0.67–1.17)
DEPRECATED CALCIDIOL+CALCIFEROL SERPL-MC: 39 UG/L (ref 20–75)
DEPRECATED HCO3 PLAS-SCNC: 24 MMOL/L (ref 22–29)
ERYTHROCYTE [DISTWIDTH] IN BLOOD BY AUTOMATED COUNT: 12.7 % (ref 10–15)
GFR SERPL CREATININE-BSD FRML MDRD: 84 ML/MIN/1.73M2
GLUCOSE SERPL-MCNC: 99 MG/DL (ref 70–99)
HBV SURFACE AB SERPL IA-ACNC: 1.05 M[IU]/ML
HBV SURFACE AB SERPL IA-ACNC: NONREACTIVE M[IU]/ML
HCT VFR BLD AUTO: 42.5 % (ref 40–53)
HDLC SERPL-MCNC: 56 MG/DL
HGB BLD-MCNC: 14.3 G/DL (ref 13.3–17.7)
LDLC SERPL CALC-MCNC: 144 MG/DL
MCH RBC QN AUTO: 30.6 PG (ref 26.5–33)
MCHC RBC AUTO-ENTMCNC: 33.6 G/DL (ref 31.5–36.5)
MCV RBC AUTO: 91 FL (ref 78–100)
NONHDLC SERPL-MCNC: 167 MG/DL
PLATELET # BLD AUTO: 214 10E3/UL (ref 150–450)
POTASSIUM SERPL-SCNC: 4.8 MMOL/L (ref 3.4–5.3)
PROT SERPL-MCNC: 7.1 G/DL (ref 6.4–8.3)
RBC # BLD AUTO: 4.67 10E6/UL (ref 4.4–5.9)
SODIUM SERPL-SCNC: 138 MMOL/L (ref 136–145)
TRIGL SERPL-MCNC: 115 MG/DL
TSH SERPL DL<=0.005 MIU/L-ACNC: 1.37 UIU/ML (ref 0.3–4.2)
WBC # BLD AUTO: 5.7 10E3/UL (ref 4–11)

## 2023-06-08 PROCEDURE — 85027 COMPLETE CBC AUTOMATED: CPT

## 2023-06-08 PROCEDURE — 80061 LIPID PANEL: CPT

## 2023-06-08 PROCEDURE — 86706 HEP B SURFACE ANTIBODY: CPT

## 2023-06-08 PROCEDURE — 80053 COMPREHEN METABOLIC PANEL: CPT

## 2023-06-08 PROCEDURE — 82306 VITAMIN D 25 HYDROXY: CPT

## 2023-06-08 PROCEDURE — 36415 COLL VENOUS BLD VENIPUNCTURE: CPT

## 2023-06-08 PROCEDURE — 84403 ASSAY OF TOTAL TESTOSTERONE: CPT

## 2023-06-08 PROCEDURE — 84443 ASSAY THYROID STIM HORMONE: CPT

## 2023-06-12 LAB — TESTOST SERPL-MCNC: 404 NG/DL (ref 240–950)

## 2023-06-12 NOTE — RESULT ENCOUNTER NOTE
Yury  Your results from your recent clinic visit show:  Your lipids look ok and I used these as well as other factors from your history to calculate your 10 year risk of having something like a heart attack (ASCVD risk) and it was low risk. Just continue to work on exercise and diet to maintain this low risk.  Your testosterone was normal  Your thyroid is normal (TSH).  Your CMP was normal with normal electrolytes, kidney function, and liver function  You are not immune to hep B. You could get your Hep B shots to become immune. Let me know if you want to do this  Your vitamin D was normal  Your CBC is normal with no anemia or signs of infection seen    If you have more questions please call the clinic at [phone] or send me a [employer] message    Dr. Alexei Amaral

## 2024-03-19 ENCOUNTER — OFFICE VISIT (OUTPATIENT)
Dept: FAMILY MEDICINE | Facility: CLINIC | Age: 39
End: 2024-03-19
Payer: COMMERCIAL

## 2024-03-19 VITALS
DIASTOLIC BLOOD PRESSURE: 69 MMHG | WEIGHT: 201 LBS | HEART RATE: 65 BPM | RESPIRATION RATE: 16 BRPM | TEMPERATURE: 98 F | OXYGEN SATURATION: 97 % | BODY MASS INDEX: 27.26 KG/M2 | SYSTOLIC BLOOD PRESSURE: 100 MMHG

## 2024-03-19 DIAGNOSIS — A04.9 BACTERIAL GASTROENTERITIS: Primary | ICD-10-CM

## 2024-03-19 PROCEDURE — 99203 OFFICE O/P NEW LOW 30 MIN: CPT | Performed by: PHYSICIAN ASSISTANT

## 2024-03-19 RX ORDER — AZITHROMYCIN 250 MG/1
500 TABLET, FILM COATED ORAL DAILY
Qty: 6 TABLET | Refills: 0 | Status: SHIPPED | OUTPATIENT
Start: 2024-03-19 | End: 2024-03-22

## 2024-03-19 NOTE — PROGRESS NOTES
Assessment & Plan:      Problem List Items Addressed This Visit    None  Visit Diagnoses       Bacterial gastroenteritis    -  Primary    Relevant Medications    azithromycin (ZITHROMAX) 250 MG tablet          Medical Decision Making  Patient presents with stomach upset and diarrhea for 1 week following a recent trip to [place].  Symptoms sound consistent with bacterial gastroenteritis.  Recommend oral antibiotics.  Low concern for cholecystitis versus appendicitis given no significant tenderness on abdominal exam.  Low concern for severe bacterial infection given patient having stable vital signs discussed treatment and symptomatic care.  Allergies and medication interactions reviewed.  Discussed signs of worsening symptoms and when to follow-up with PCP if no symptom improvement.     Subjective:      Yury Humphries is a 38 year old male here for evaluation of stomach upset and diarrhea.  Onset of symptoms was 1 week ago.  Patient returned from a trip to [place] shortly before symptoms began.  Patient initially had 10 episodes of loose stools daily for 3 days.  Diarrhea then improved.  Over the last few days patient now having more abdominal cramping and sharp pains throughout.  He is otherwise eating and drinking without difficulty.  No fevers or blood in stools.     The following portions of the patient's history were reviewed and updated as appropriate: allergies, current medications, and problem list.     Review of Systems  Pertinent items are noted in HPI.    Allergies  No Known Allergies    Family History   Problem Relation Age of Onset    Skin Cancer Mother     Cancer No family hx of     Colon Cancer No family hx of     Heart Disease No family hx of        Social History     Tobacco Use    Smoking status: Former     Packs/day: 0.50     Years: 4.00     Additional pack years: 0.00     Total pack years: 2.00     Types: Cigarettes     Quit date:      Years since quittin.2    Smokeless tobacco:  Current     Types: Chew   Substance Use Topics    Alcohol use: Yes     Alcohol/week: 1.0 standard drink of alcohol        Objective:      /69   Pulse 65   Temp 98  F (36.7  C) (Oral)   Resp 16   Wt 91.2 kg (201 lb)   SpO2 97%   BMI 27.26 kg/m    General appearance - alert, well appearing, and in no distress and non-toxic  Abdomen - no tenderness to palpation throughout, abdomen soft, no masses organomegaly, normal bowel sounds  Back exam - no CVA tenderness    The use of Dragon/EvaluAgent dictation services was used to construct the content of this note; any grammatical errors are non-intentional. Please contact the author directly if you are in need of any clarification.

## 2024-07-20 ENCOUNTER — HEALTH MAINTENANCE LETTER (OUTPATIENT)
Age: 39
End: 2024-07-20

## 2025-05-12 ENCOUNTER — OFFICE VISIT (OUTPATIENT)
Dept: FAMILY MEDICINE | Facility: CLINIC | Age: 40
End: 2025-05-12
Payer: COMMERCIAL

## 2025-05-12 VITALS
OXYGEN SATURATION: 98 % | HEIGHT: 71 IN | DIASTOLIC BLOOD PRESSURE: 67 MMHG | HEART RATE: 63 BPM | TEMPERATURE: 97.6 F | BODY MASS INDEX: 29.41 KG/M2 | WEIGHT: 210.1 LBS | SYSTOLIC BLOOD PRESSURE: 110 MMHG | RESPIRATION RATE: 14 BRPM

## 2025-05-12 DIAGNOSIS — Z00.00 ANNUAL PHYSICAL EXAM: Primary | ICD-10-CM

## 2025-05-12 PROBLEM — L40.9 PSORIASIS: Status: ACTIVE | Noted: 2025-05-12

## 2025-05-12 PROCEDURE — 99396 PREV VISIT EST AGE 40-64: CPT | Performed by: FAMILY MEDICINE

## 2025-05-12 PROCEDURE — 3078F DIAST BP <80 MM HG: CPT | Performed by: FAMILY MEDICINE

## 2025-05-12 PROCEDURE — 3074F SYST BP LT 130 MM HG: CPT | Performed by: FAMILY MEDICINE

## 2025-05-12 SDOH — HEALTH STABILITY: PHYSICAL HEALTH: ON AVERAGE, HOW MANY DAYS PER WEEK DO YOU ENGAGE IN MODERATE TO STRENUOUS EXERCISE (LIKE A BRISK WALK)?: 4 DAYS

## 2025-05-12 SDOH — HEALTH STABILITY: PHYSICAL HEALTH: ON AVERAGE, HOW MANY MINUTES DO YOU ENGAGE IN EXERCISE AT THIS LEVEL?: 40 MIN

## 2025-05-12 ASSESSMENT — SOCIAL DETERMINANTS OF HEALTH (SDOH): HOW OFTEN DO YOU GET TOGETHER WITH FRIENDS OR RELATIVES?: TWICE A WEEK

## 2025-05-12 NOTE — PROGRESS NOTES
"Preventive Care Visit  [facility] YUNIEL Lozano MD, Family Medicine  May 12, 2025      Assessment & Plan     Annual physical exam:  - Overall health appears good. Mildly elevated LDL cholesterol noted from June 2023 labs. Not immune to hepatitis B, low risk for infection and declines to start vaccine series today.  - Schedule follow-up lab appointment for fasting blood work, including testosterone levels and food allergy testing. Continue monitoring cholesterol levels and maintain a balanced diet with fruits and vegetables.    Consent was obtained from the patient to use an AI documentation tool in the creation of this note.            BMI  Estimated body mass index is 29.28 kg/m  as calculated from the following:    Height as of this encounter: 1.804 m (5' 11.02\").    Weight as of this encounter: 95.3 kg (210 lb 1.6 oz).   Weight management plan: Discussed healthy diet and exercise guidelines    Counseling  Appropriate preventive services were addressed with this patient via screening, questionnaire, or discussion as appropriate for fall prevention, nutrition, physical activity, Tobacco-use cessation, social engagement, weight loss and cognition.  Checklist reviewing preventive services available has been given to the patient.  Reviewed patient's diet, addressing concerns and/or questions.   The patient was instructed to see the dentist every 6 months.           Sandy Cotton is a 39 year old, presenting for the following:  Physical (Patient is here for a physical. Not fasting. )        5/12/2025    10:32 AM   Additional Questions   Roomed by suresh avila cma   Accompanied by self          HPI    History of Present Illness-  Yury Portilloquita, 39 years    Psoriasis  - Psoriasis improved with dietary changes  - Previously affected the head, triggered by weather  - No joint swelling, only skin involvement  - Past use of cortisone creams during flare-ups    Lower Back Issues  - History of " lower back inflammation  - Symptoms improved with fasting, possibly related to food allergies    Bloating and Constipation  - Experienced bloating and constipation, possibly linked to food allergies  - Symptoms improved after quitting energy drinks and increasing fiber intake    Cholesterol  - Blood work in June 2023 showed mildly elevated LDL cholesterol at 144  - Total cholesterol was 223, HDL cholesterol was 56  - Regular exercise and dietary changes with more towards carnivore diet, low fruit and vegetable intake.          Advance Care Planning    Patient states has Health Care Directive and will send to [employer].        5/12/2025   General Health   How would you rate your overall physical health? Excellent   Feel stress (tense, anxious, or unable to sleep) Not at all         5/12/2025   Nutrition   Three or more servings of calcium each day? (!) NO   Diet: Breakfast skipped   How many servings of fruit and vegetables per day? (!) 0-1   How many sweetened beverages each day? 0-1         5/12/2025   Exercise   Days per week of moderate/strenous exercise 4 days   Average minutes spent exercising at this level 40 min         5/12/2025   Social Factors   Frequency of gathering with friends or relatives Twice a week   Worry food won't last until get money to buy more No   Food not last or not have enough money for food? No   Do you have housing? (Housing is defined as stable permanent housing and does not include staying outside in a car, in a tent, in an abandoned building, in an overnight shelter, or couch-surfing.) Yes   Are you worried about losing your housing? No   Lack of transportation? No   Unable to get utilities (heat,electricity)? No         5/12/2025   Dental   Dentist two times every year? (!) NO         Today's PHQ-2 Score:       5/12/2025    10:30 AM   PHQ-2 ( 1999 [employer])   Q1: Little interest or pleasure in doing things 0   Q2: Feeling down, depressed or hopeless 0   PHQ-2 Score 0    Q1:  "Little interest or pleasure in doing things Not at all   Q2: Feeling down, depressed or hopeless Not at all   PHQ-2 Score 0       Patient-reported           2025   Substance Use   Alcohol more than 3/day or more than 7/wk No   Do you use any other substances recreationally? No     Social History     Tobacco Use    Smoking status: Former     Current packs/day: 0.00     Average packs/day: 0.5 packs/day for 4.0 years (2.0 ttl pk-yrs)     Types: Cigarettes     Start date:      Quit date:      Years since quittin.3    Smokeless tobacco: Former     Types: Chew    Tobacco comments:     Stopped chew almost a year ago.    Vaping Use    Vaping status: Never Used   Substance Use Topics    Alcohol use: Yes     Alcohol/week: 1.0 standard drink of alcohol    Drug use: No           2025   STI Screening   New sexual partner(s) since last STI/HIV test? No   ASCVD Risk   The 10-year ASCVD risk score (Leeann DEGROOT, et al., 2019) is: 1%    Values used to calculate the score:      Age: 39 years      Sex: Male      Is Non- : No      Diabetic: No      Tobacco smoker: No      Systolic Blood Pressure: 110 mmHg      Is BP treated: No      HDL Cholesterol: 56 mg/dL      Total Cholesterol: 223 mg/dL        2025   Contraception/Family Planning   Questions about contraception or family planning No        Reviewed and updated as needed this visit by Provider                          Review of Systems  Constitutional, HEENT, cardiovascular, pulmonary, gi and gu systems are negative, except as otherwise noted.     Objective    Exam  /67 (BP Location: Right arm, Patient Position: Sitting, Cuff Size: Adult Large)   Pulse 63   Temp 97.6  F (36.4  C) (Temporal)   Resp 14   Ht 1.804 m (\")   Wt 95.3 kg (210 lb 1.6 oz)   SpO2 98%   BMI 29.28 kg/m     Estimated body mass index is 29.28 kg/m  as calculated from the following:    Height as of this encounter: 1.804 m (\").   "  Weight as of this encounter: 95.3 kg (210 lb 1.6 oz).    Physical Exam  GENERAL: alert and no distress  EYES: Eyes grossly normal to inspection, PERRL and conjunctivae and sclerae normal  HENT: ear canals and TM's normal, nose and mouth without ulcers or lesions  NECK: no adenopathy, no asymmetry, masses, or scars  RESP: lungs clear to auscultation - no rales, rhonchi or wheezes  CV: regular rate and rhythm, normal S1 S2, no S3 or S4, no murmur, click or rub, no peripheral edema  ABDOMEN: soft, nontender, no hepatosplenomegaly, no masses and bowel sounds normal  MS: no gross musculoskeletal defects noted, no edema  SKIN: no suspicious lesions or rashes  NEURO: Normal strength and tone, mentation intact and speech normal  PSYCH: mentation appears normal, affect normal/bright        Signed Electronically by: Jerry Lozano MD

## 2025-05-13 ENCOUNTER — LAB (OUTPATIENT)
Dept: LAB | Facility: CLINIC | Age: 40
End: 2025-05-13
Payer: COMMERCIAL

## 2025-05-13 DIAGNOSIS — Z00.00 ANNUAL PHYSICAL EXAM: ICD-10-CM

## 2025-05-13 LAB
ALBUMIN SERPL BCG-MCNC: 4.3 G/DL (ref 3.5–5.2)
ALP SERPL-CCNC: 55 U/L (ref 40–150)
ALT SERPL W P-5'-P-CCNC: 27 U/L (ref 0–70)
ANION GAP SERPL CALCULATED.3IONS-SCNC: 9 MMOL/L (ref 7–15)
AST SERPL W P-5'-P-CCNC: 22 U/L (ref 0–45)
BASOPHILS # BLD AUTO: 0 10E3/UL (ref 0–0.2)
BASOPHILS NFR BLD AUTO: 0 %
BILIRUB SERPL-MCNC: 0.9 MG/DL
BUN SERPL-MCNC: 21.5 MG/DL (ref 6–20)
CALCIUM SERPL-MCNC: 9 MG/DL (ref 8.8–10.4)
CHLORIDE SERPL-SCNC: 102 MMOL/L (ref 98–107)
CHOLEST SERPL-MCNC: 245 MG/DL
CREAT SERPL-MCNC: 1.15 MG/DL (ref 0.67–1.17)
EGFRCR SERPLBLD CKD-EPI 2021: 82 ML/MIN/1.73M2
EOSINOPHIL # BLD AUTO: 0.1 10E3/UL (ref 0–0.7)
EOSINOPHIL NFR BLD AUTO: 1 %
ERYTHROCYTE [DISTWIDTH] IN BLOOD BY AUTOMATED COUNT: 13.1 % (ref 10–15)
FASTING STATUS PATIENT QL REPORTED: YES
FASTING STATUS PATIENT QL REPORTED: YES
GLUCOSE SERPL-MCNC: 107 MG/DL (ref 70–99)
HCO3 SERPL-SCNC: 25 MMOL/L (ref 22–29)
HCT VFR BLD AUTO: 43.6 % (ref 40–53)
HDLC SERPL-MCNC: 54 MG/DL
HGB BLD-MCNC: 14.9 G/DL (ref 13.3–17.7)
IMM GRANULOCYTES # BLD: 0 10E3/UL
IMM GRANULOCYTES NFR BLD: 0 %
LDLC SERPL CALC-MCNC: 170 MG/DL
LYMPHOCYTES # BLD AUTO: 2.1 10E3/UL (ref 0.8–5.3)
LYMPHOCYTES NFR BLD AUTO: 36 %
MCH RBC QN AUTO: 30.2 PG (ref 26.5–33)
MCHC RBC AUTO-ENTMCNC: 34.2 G/DL (ref 31.5–36.5)
MCV RBC AUTO: 88 FL (ref 78–100)
MONOCYTES # BLD AUTO: 0.6 10E3/UL (ref 0–1.3)
MONOCYTES NFR BLD AUTO: 10 %
NEUTROPHILS # BLD AUTO: 3.1 10E3/UL (ref 1.6–8.3)
NEUTROPHILS NFR BLD AUTO: 52 %
NONHDLC SERPL-MCNC: 191 MG/DL
PLATELET # BLD AUTO: 251 10E3/UL (ref 150–450)
POTASSIUM SERPL-SCNC: 4.5 MMOL/L (ref 3.4–5.3)
PROT SERPL-MCNC: 6.8 G/DL (ref 6.4–8.3)
RBC # BLD AUTO: 4.94 10E6/UL (ref 4.4–5.9)
SHBG SERPL-SCNC: 35 NMOL/L (ref 11–80)
SODIUM SERPL-SCNC: 136 MMOL/L (ref 135–145)
TRIGL SERPL-MCNC: 103 MG/DL
WBC # BLD AUTO: 5.9 10E3/UL (ref 4–11)

## 2025-05-13 PROCEDURE — 36415 COLL VENOUS BLD VENIPUNCTURE: CPT

## 2025-05-13 PROCEDURE — 84270 ASSAY OF SEX HORMONE GLOBUL: CPT

## 2025-05-13 PROCEDURE — 80053 COMPREHEN METABOLIC PANEL: CPT

## 2025-05-13 PROCEDURE — 85025 COMPLETE CBC W/AUTO DIFF WBC: CPT

## 2025-05-13 PROCEDURE — 80061 LIPID PANEL: CPT

## 2025-05-13 PROCEDURE — 86003 ALLG SPEC IGE CRUDE XTRC EA: CPT

## 2025-05-13 PROCEDURE — 82785 ASSAY OF IGE: CPT

## 2025-05-13 PROCEDURE — 84403 ASSAY OF TOTAL TESTOSTERONE: CPT

## 2025-05-15 ENCOUNTER — RESULTS FOLLOW-UP (OUTPATIENT)
Dept: FAMILY MEDICINE | Facility: CLINIC | Age: 40
End: 2025-05-15

## 2025-05-15 LAB
ALMOND IGE QN: <0.1 KU(A)/L
CASHEW NUT IGE QN: <0.1 KU(A)/L
CODFISH IGE QN: <0.1 KU(A)/L
COW MILK IGE QN: <0.1 KU(A)/L
EGG WHITE IGE QN: <0.1 KU(A)/L
HAZELNUT IGE QN: <0.1 KU(A)/L
IGE SERPL-ACNC: 19 KU/L (ref 0–114)
PEANUT IGE QN: <0.1 KU(A)/L
SALMON IGE QN: <0.1 KU(A)/L
SCALLOP IGE QN: <0.1 KU(A)/L
SESAME SEED IGE QN: <0.1 KU(A)/L
SHRIMP IGE QN: <0.1 KU(A)/L
SOYBEAN IGE QN: <0.1 KU(A)/L
TESTOST FREE SERPL-MCNC: 8.91 NG/DL
TESTOST SERPL-MCNC: 447 NG/DL (ref 240–950)
TUNA IGE QN: <0.1 KU(A)/L
WALNUT IGE QN: <0.1 KU(A)/L
WHEAT IGE QN: <0.1 KU(A)/L